# Patient Record
Sex: MALE | Race: WHITE | URBAN - METROPOLITAN AREA
[De-identification: names, ages, dates, MRNs, and addresses within clinical notes are randomized per-mention and may not be internally consistent; named-entity substitution may affect disease eponyms.]

---

## 2019-11-05 ENCOUNTER — OFFICE VISIT (OUTPATIENT)
Dept: FAMILY MEDICINE | Facility: CLINIC | Age: 39
End: 2019-11-05
Payer: COMMERCIAL

## 2019-11-05 VITALS
DIASTOLIC BLOOD PRESSURE: 89 MMHG | HEART RATE: 71 BPM | OXYGEN SATURATION: 97 % | RESPIRATION RATE: 16 BRPM | HEIGHT: 64 IN | WEIGHT: 153 LBS | SYSTOLIC BLOOD PRESSURE: 133 MMHG | BODY MASS INDEX: 26.12 KG/M2 | TEMPERATURE: 98.8 F

## 2019-11-05 DIAGNOSIS — Z23 NEED FOR PROPHYLACTIC VACCINATION AND INOCULATION AGAINST INFLUENZA: ICD-10-CM

## 2019-11-05 DIAGNOSIS — K21.9 GASTROESOPHAGEAL REFLUX DISEASE, ESOPHAGITIS PRESENCE NOT SPECIFIED: ICD-10-CM

## 2019-11-05 DIAGNOSIS — Z00.00 ROUTINE GENERAL MEDICAL EXAMINATION AT A HEALTH CARE FACILITY: Primary | ICD-10-CM

## 2019-11-05 LAB
CHOLEST SERPL-MCNC: 156.8 MG/DL (ref 0–200)
CHOLEST/HDLC SERPL: 4.9 {RATIO} (ref 0–5)
HDLC SERPL-MCNC: 32 MG/DL
LDLC SERPL CALC-MCNC: 93 MG/DL (ref 0–129)
TRIGL SERPL-MCNC: 157.8 MG/DL (ref 0–150)
VLDL CHOLESTEROL: 31.6 MG/DL (ref 7–32)

## 2019-11-05 RX ORDER — FAMOTIDINE 20 MG/1
20 TABLET, FILM COATED ORAL 2 TIMES DAILY
Qty: 60 TABLET | Refills: 2 | Status: SHIPPED | OUTPATIENT
Start: 2019-11-05

## 2019-11-05 SDOH — HEALTH STABILITY: MENTAL HEALTH: HOW MANY STANDARD DRINKS CONTAINING ALCOHOL DO YOU HAVE ON A TYPICAL DAY?: 1 OR 2

## 2019-11-05 ASSESSMENT — MIFFLIN-ST. JEOR: SCORE: 1507.06

## 2019-11-05 NOTE — PROGRESS NOTES
Preceptor Attestation:   Patient seen, evaluated and discussed with the resident. I have verified the content of the note, which accurately reflects my assessment of the patient and the plan of care.   Supervising Physician:  Prerna Guadarrama MD

## 2019-11-05 NOTE — LETTER
November 6, 2019      Tae Samayoa  [address]  [place] [address]        Dear Tae,    Thank you for getting your care at [facility]. Please see below for your test results. Your triglycerides, a type of cholesterol, is slightly high, but not concerning. We may check your cholesterol again sooner, usually it's every couple of years. Best of luck with deer season!    Resulted Orders   Lipid Cascade ([facility])   Result Value Ref Range    Cholesterol 156.8 0.0 - 200.0 mg/dL    Cholesterol/HDL Ratio 4.9 0.0 - 5.0    HDL Cholesterol 32.0 (L) >40.0 mg/dL    Triglycerides 157.8 (H) 0.0 - 150.0 mg/dL    VLDL Cholesterol 31.6 7.0 - 32.0 mg/dL    LDL Cholesterol Calculated 93 0 - 129 mg/dL       If you have any questions, please call the clinic to make an appointment with me for a clinic visit.    Sincerely,    Amanda Prakash MD

## 2019-11-05 NOTE — PROGRESS NOTES
Male Physical Note          HPI         Concerns today: No special concerns today.      There is no problem list on file for this patient.      Past Medical History:   Diagnosis Date     Acid reflux         Family History   Problem Relation Age of Onset     Valvular heart disease Mother         Valve replacement     Cerebrovascular Disease Father         early 50s     Diabetes Maternal Grandmother      Diabetes Maternal Grandfather               Review of Systems:     Review of Systems:  CONSTITUTIONAL: NEGATIVE for fever, chills, change in weight  INTEGUMENTARY/SKIN: NEGATIVE for worrisome rashes, moles or lesions  EYES: NEGATIVE for vision changes or irritation; Issues with near sightedness   ENT/MOUTH: NEGATIVE for ear, mouth and throat problems  RESP: NEGATIVE for significant cough or SOB  CV: NEGATIVE for chest pain, peripheral edema; Positive for intermittent palps  GI: NEGATIVE for nausea, abdominal pain; Positive for heartburn   : NEGATIVE for frequency, dysuria, or hematuria  MUSCULOSKELETAL: NEGATIVE for significant arthralgias or myalgia; Intermittent elbow pain bilateral (rt handed)   NEURO: NEGATIVE for weakness, dizziness or paresthesias    Sleep:   Do you snore most or the night (as reported by a family member)? Yes  Do you feel sleepy or extremely tired during most of the day? No           Social History     Social History     Socioeconomic History     Marital status:      Spouse name: Not on file     Number of children: 2     Years of education: Not on file     Highest education level: Not on file   Occupational History     Occupation:    Social Needs     Financial resource strain: Not on file     Food insecurity:     Worry: Not on file     Inability: Not on file     Transportation needs:     Medical: Not on file     Non-medical: Not on file   Tobacco Use     Smoking status: Former Smoker     Packs/day: 0.50     Types: Cigarettes     Last attempt to quit: 2013     Years since quitting:  "6.8     Smokeless tobacco: Never Used   Substance and Sexual Activity     Alcohol use: Yes     Drinks per session: 1 or 2     Comment: 2 drinks a day, more social drinking     Drug use: Yes     Types: Marijuana     Comment: Couple times a week, bowl      Sexual activity: Yes     Partners: Female     Comment: Unsure if spouse on birth control, has been on depo in the past   Lifestyle     Physical activity:     Days per week: Not on file     Minutes per session: Not on file     Stress: Not on file   Relationships     Social connections:     Talks on phone: Not on file     Gets together: Not on file     Attends Druze service: Not on file     Active member of club or organization: Not on file     Attends meetings of clubs or organizations: Not on file     Relationship status: Not on file     Intimate partner violence:     Fear of current or ex partner: Not on file     Emotionally abused: Not on file     Physically abused: Not on file     Forced sexual activity: Not on file   Other Topics Concern     Not on file   Social History Narrative     Not on file       Marital Status:  Who lives in your household? Wife, daughter (turning 6th!) and multiple dogs     Has anyone hurt you physically, for example by pushing, hitting, slapping or kicking you or forcing you to have sex? Denies  Do you feel threatened or controlled by a partner, ex-partner or anyone in your life? Denies    Sexual Health     Sexual concerns: No   STI History: Neg    Recommended Screening     Cholesterol Level (>46 yo or at risk):  Recommended and patient accepted testing.    HIV screening:  Recommended and patient declined testing.           Physical Exam:     Vitals: /89   Pulse 71   Temp 98.8  F (37.1  C) (Oral)   Resp 16   Ht 1.613 m (5' 3.5\")   Wt 69.4 kg (153 lb)   SpO2 97%   BMI 26.68 kg/m    BMI= Body mass index is 26.68 kg/m .     GENERAL: healthy, alert and no distress  EYES: Eyes grossly normal to inspection, extraocular " movements - intact, and PERRL  HENT: ear canals- normal; TMs- normal; Nose- normal; Mouth- no ulcers, no lesions  NECK: no tenderness, no adenopathy, no asymmetry, no masses, no stiffness; thyroid- normal to palpation  RESP: lungs clear to auscultation - no rales, no rhonchi, no wheezes  CV: regular rates and rhythm, normal S1 S2, no S3 or S4 and no murmur, no click or rub -  ABDOMEN: soft, no tenderness, no  hepatosplenomegaly, no masses, normal bowel sounds  MS: extremities- no gross deformities noted, no edema  SKIN: no suspicious lesions, no rashes  NEURO: strength and tone- normal, sensory exam- grossly normal, mentation- intact, speech- normal, reflexes- symmetric  - male: declined  PSYCH: Alert and oriented times 3; speech- coherent , normal rate and volume; able to articulate logical thoughts, able to abstract reason, no tangential thoughts, no hallucinations or delusions, affect- normal  LYMPHATICS: ant. cervical- normal, post. cervical- normal, supraclavicular- normal    Assessment and Plan     Tae was seen today for physical and imm/inj.      Routine general medical examination at a health care facility  New patient thus past medical, surgical history and family history were updated in the chart. Patient agreeable for lipid screening. Declined HIV screening.   -     Lipid Cascade ([facility])    Gastroesophageal reflux disease, esophagitis presence not specified  Trial with pepcid, may consider PPI trial if no improvement. If persist would consider EGD.   -     famotidine (PEPCID) 20 MG tablet; Take 1 tablet (20 mg) by mouth 2 times daily    Need for prophylactic vaccination and inoculation against influenza  Thanked patient for staying UTD with vaccines.   -     Fluzone quad, preserve-free/prefilled  0.5ml, 6+ months [78269]        1. Health Care Maintenance: Normal Physical Exam    PLAN:  Lipid panel ordered.  and Routine follow up in one year.    Options for treatment and follow-up care were  reviewed with the patient. Tae Samayoa and/or guardian engaged in the decision making process and verbalized understanding of the options discussed and agreed with the final plan.    Amanda Prakash MD  [facility] Resident PGY-3  [phone]

## 2022-02-17 PROBLEM — K21.9 GASTROESOPHAGEAL REFLUX DISEASE: Status: ACTIVE | Noted: 2019-11-13
